# Patient Record
Sex: FEMALE | Race: WHITE | URBAN - METROPOLITAN AREA
[De-identification: names, ages, dates, MRNs, and addresses within clinical notes are randomized per-mention and may not be internally consistent; named-entity substitution may affect disease eponyms.]

---

## 2024-08-16 ENCOUNTER — HOME HEALTH ADMISSION (OUTPATIENT)
Dept: HOME HEALTH SERVICES | Facility: HOME HEALTH | Age: 69
End: 2024-08-16
Payer: MEDICARE

## 2024-08-19 ENCOUNTER — HOME CARE VISIT (OUTPATIENT)
Dept: SCHEDULING | Facility: HOME HEALTH | Age: 69
End: 2024-08-19

## 2024-08-19 VITALS
TEMPERATURE: 98.5 F | OXYGEN SATURATION: 96 % | HEART RATE: 58 BPM | SYSTOLIC BLOOD PRESSURE: 110 MMHG | DIASTOLIC BLOOD PRESSURE: 60 MMHG | RESPIRATION RATE: 16 BRPM

## 2024-08-19 PROCEDURE — 0221000100 HH NO PAY CLAIM PROCEDURE

## 2024-08-19 PROCEDURE — G0299 HHS/HOSPICE OF RN EA 15 MIN: HCPCS

## 2024-08-20 ENCOUNTER — HOME CARE VISIT (OUTPATIENT)
Dept: SCHEDULING | Facility: HOME HEALTH | Age: 69
End: 2024-08-20

## 2024-08-20 VITALS
OXYGEN SATURATION: 96 % | SYSTOLIC BLOOD PRESSURE: 97 MMHG | DIASTOLIC BLOOD PRESSURE: 66 MMHG | HEART RATE: 62 BPM | TEMPERATURE: 97.8 F

## 2024-08-20 PROCEDURE — G0151 HHCP-SERV OF PT,EA 15 MIN: HCPCS

## 2024-08-20 ASSESSMENT — ENCOUNTER SYMPTOMS
DYSPNEA ACTIVITY LEVEL: AFTER AMBULATING 10 - 20 FT
PAIN LOCATION - PAIN QUALITY: ACHY

## 2024-08-20 NOTE — HOME HEALTH
Came home friday after being in hospital and rehab since the end of May 24. She returned home 4 days ago. She lives in single family home with her  who had a stroke some years ago so he does not drive. Patient has good family support. She reports that she had colostomy placed (reversible) after removal of abdominal abscess. She reports she is independent in colostomy management. She reports LBP of 6/10 today in lower right side. Surgical scar mid abdomen,abdominal drain was in rhs abdomen anteriorly and her colostomy bag is on LHS.  She has a past medical hx significant for diverticulitus, atrial fibrillation, DM type 2, COPD, CHF, HTN, hypokalemia, obesity. Patient reports that her colostomy bag is on side and she still has tenderness over right side where her abdominal abscess was.  Patient was evaluated by skilled physical therapy today with the following findings:  Posture: Flexed posture of hips in standing and during ambulation although she is able to correct with verbal cues.   Transfers: Min assist sit<>stand, min-mod assist from bed<>Stand, min-mod assist for getting on and off commode. Did not attempt stepping in and out of tub today secondary to decreased strength and balance deficits.   Balance: Good in sitting, fair in standing, fair- during ambulation with wheeled walker especially during turns. Patient scores > 30s on TUG and 14/28 on Tinetti TAWANA indicating that she is at high risk of falls.   Strength: Patient has decreased trunk strength as evidenced by her diffculty rolling and transferring supine<>sit. She also exhibits bilateral LE weakness with bilateral hip strength of 3-/5 for major muscle groups of both hips, 3/5 for bilateral knee flexors and extensors and 3+/5 for bilateral ankle plantar and dorsiflexors.   Gait: Patient is currently ambulating with mod assist with wheeled walker. She ambulated 60 feet today with mod assist for safety during turns and mod assist and verbal cues for

## 2024-08-21 ENCOUNTER — HOME CARE VISIT (OUTPATIENT)
Dept: SCHEDULING | Facility: HOME HEALTH | Age: 69
End: 2024-08-21

## 2024-08-21 VITALS
TEMPERATURE: 97.1 F | SYSTOLIC BLOOD PRESSURE: 95 MMHG | OXYGEN SATURATION: 98 % | RESPIRATION RATE: 18 BRPM | DIASTOLIC BLOOD PRESSURE: 66 MMHG | HEART RATE: 59 BPM

## 2024-08-21 PROCEDURE — G0152 HHCP-SERV OF OT,EA 15 MIN: HCPCS

## 2024-08-21 ASSESSMENT — ENCOUNTER SYMPTOMS: PAIN LOCATION - PAIN QUALITY: ACHE

## 2024-08-21 NOTE — HOME HEALTH
This patient has answered NO to the following questions:   1) have you traveled outside the country   2) have you been exposed to or been in contact with anyone whom traveled outside the country in the past two weeks   3) do you have a sore throat/fever/dry cough      4)The patient has been educated on and demonstrates good understanding via the teach-back method for the following: Signs and symptoms of COVID-19 yes    Pt is a 69 year old female who lives with . Pt referred for therapy  after being in hospital may  due to abdominal abscess, followed with rehabed home 5 days ago. She lives in single family home with her  who had a stroke some years ago so he does not drive. Patient has good family support. She reports that she had colostomy placed (reversible) after removal of abdominal abscess. She reports she is independent in colostomy management. She reports LBP of 7/10 today in lower right side.  She has a past medical hx significant for diverticulitus, atrial fibrillation, DM type 2, COPD, CHF, HTN, hypokalemia, obesity,  back surgery dec 1998  Patient reports that her colostomy bag is on side and she still has tenderness over right side where her abdominal abscess was. Pt ambulates in home with walker with CGA for safety, VC for positioning of walker with funcitonal transfers, entering small bedroom and bathroom to reduce risk of falls. Pt requires CGA for ADLs ,  demo's decresaed standing tolerance/balance requiring frequent rest periods with ADL's and IADL's and UE support at all times.   P has been performing sponge bath only since return home with step over tub and unstable grab bar. Pt reports she is ordering a  shower bench for bathing.  Pt demo's bilat UE strength 3+/5 for transfers. OT recommended removing shower doors and  using shower curtain to accomodate shower bench. Shower transfer was not assessed and to be assessed when pt has tub bench and doors removed with shower curtain.

## 2024-08-23 ASSESSMENT — ENCOUNTER SYMPTOMS
PAIN LOCATION - PAIN QUALITY: ACHY
DYSPNEA ACTIVITY LEVEL: AFTER AMBULATING 10 - 20 FT

## 2024-08-23 NOTE — HOME HEALTH
Reason for the visit:SOC  History of present illness:A fib  Chief complaint and PMH:A fib, colostomy   General description of the patient:alert and oriented, skin intact, colostomy noted, uses walker and wheelchair for ambulation  Assessment, plan and focus of care:disease process education, medication managment   Caregiver involvement:  provides assistance   Medication reconciliation:no med changes  Reason for homebound status:weakness, taxing effort to leave home   Patient education provided this visit: disease process managment, medication managment   Patient/cg response to education:patient and  able to teach back and verbalized understanding   PCP/Next PCP appointment:will schedule, nurse offered to schedule but patient states she has to figure out her schedule first   Continues to need nursing care for:disease process education, medication managment  The following discharge planning was discussed with the patient:when goals met

## 2024-08-27 ENCOUNTER — HOME CARE VISIT (OUTPATIENT)
Dept: SCHEDULING | Facility: HOME HEALTH | Age: 69
End: 2024-08-27

## 2024-08-27 VITALS
DIASTOLIC BLOOD PRESSURE: 58 MMHG | SYSTOLIC BLOOD PRESSURE: 100 MMHG | OXYGEN SATURATION: 95 % | HEART RATE: 62 BPM | TEMPERATURE: 96.5 F | RESPIRATION RATE: 18 BRPM

## 2024-08-27 VITALS
SYSTOLIC BLOOD PRESSURE: 105 MMHG | TEMPERATURE: 97.6 F | HEART RATE: 55 BPM | RESPIRATION RATE: 18 BRPM | OXYGEN SATURATION: 96 % | DIASTOLIC BLOOD PRESSURE: 60 MMHG

## 2024-08-27 PROCEDURE — G0152 HHCP-SERV OF OT,EA 15 MIN: HCPCS

## 2024-08-27 PROCEDURE — G0151 HHCP-SERV OF PT,EA 15 MIN: HCPCS

## 2024-08-27 ASSESSMENT — ENCOUNTER SYMPTOMS
PAIN LOCATION - PAIN QUALITY: ACHY
PAIN LOCATION - PAIN QUALITY: ACHE

## 2024-08-27 NOTE — HOME HEALTH
This patient has answered NO to the following questions:   1) have you traveled outside the country   2) have you been exposed to or been in contact with anyone whom traveled outside the country in the past two weeks   3) do you have a sore throat/fever/dry cough      4)The patient has been educated on and demonstrates good understanding via the teach-back method for the following: Signs and symptoms of COVID-19 yes    No reported changes or incidents since previous visit  Pt sees primary MD tomorrow.  Pt instructed in safe transfer to shower with new shower bench, OT adjusted chair to place properly in tub and also adjust height of bench.  Pt able to perform with CGA and min VC for proper technique and keeping body aligned to prevent falls. Pt has taken doors to shower down and plans to buy shower curtain. OT recommended rubber back shower rug for outside of tub vs towels which slide on floor. Pt is indep with changing colostomy.   Pt did not want assist with bathing today reporting she did one yestserday and will do tomorrow prior to MD appointmentment.  Instructed pt in safe mobility in home, pt now ambulating with no assistive device with CGA, VC to slow down  .   Instructed pt in bilat UE strengthening in unsupported sitting to increase core strength for sitting ADLs, posture exercises and deep breathing.  Pt tolerates 10 reps to shoulders, elbows,   in unsupported sitting to increase core and trunk control for sitting ADL's.  Pt requires mod VC for proper form, breathing, pacing and slowing exercises down after teach back method  Plan to continue increasing UE strength, ADL's, safety with transfers.  Discussed DC plan  with pt and caregiver, plan to DC when goals met  Pt progressing well towards OT goals

## 2024-08-27 NOTE — HOME HEALTH
Patient reports that she is managing back pain by getting up and moving around frequently, Reviewed medications - patient has still not received Eliquis prescription and has appt with PCP tomorrow to follow-up on same. She is independent in HEP of  ankle pumps; knee ext, marching, hip abduction/adduction 3 x 10 and sit<>stand x 5 in sitting as evidenced by teach-back method. She has purchased a shower bench and is able to  get in and out of tub to take a shower with SBA.  Gait reeducation with wheeled walker x 120 feet, indoors and outdoors including small step at front door and incline on driveway.  Transfer transfers sit<>Stand, bed<>Stand independently. Patient was able to participate well in all activities although she was fatigued after tx. She demonstrated good understanding of all tasks using teach back method. She is now being discharged from PT services as all goals have been met. She has follow up visit with PCP tomorrow.

## 2024-08-28 ENCOUNTER — HOME CARE VISIT (OUTPATIENT)
Dept: SCHEDULING | Facility: HOME HEALTH | Age: 69
End: 2024-08-28

## 2024-08-29 NOTE — HOME HEALTH
The patient made the nurse aware that she had several appointments today and preferred a visit to be made on another day.

## 2024-09-04 ENCOUNTER — HOME CARE VISIT (OUTPATIENT)
Dept: SCHEDULING | Facility: HOME HEALTH | Age: 69
End: 2024-09-04
Payer: MEDICARE

## 2024-09-04 VITALS
RESPIRATION RATE: 18 BRPM | SYSTOLIC BLOOD PRESSURE: 99 MMHG | DIASTOLIC BLOOD PRESSURE: 57 MMHG | HEART RATE: 60 BPM | TEMPERATURE: 97 F | OXYGEN SATURATION: 95 %

## 2024-09-04 ASSESSMENT — ENCOUNTER SYMPTOMS: PAIN LOCATION - PAIN QUALITY: ACHE

## 2024-09-04 NOTE — HOME HEALTH
This patient has answered NO to the following questions:   1) have you traveled outside the country   2) have you been exposed to or been in contact with anyone whom traveled outside the country in the past two weeks   3) do you have a sore throat/fever/dry cough      4)The patient has been educated on and demonstrates good understanding via the teach-back method for the following: Signs and symptoms of COVID-19 yes      No reported changes or incidents since previous visit  Pt seen for OT DC today. Pt insructed in bilat UE strenghening, posture exercises, core strengthening and trunk control to assist with ADL's, transfers. Instructed pt in energy conservation , body mechanics, work simplification with ADL's to reduce fatigue and risk for falls, adaptive equipment recommendations and training  Pt indep with HEP and met all goals, receptive to OT DC.

## 2024-09-11 ENCOUNTER — HOME CARE VISIT (OUTPATIENT)
Dept: HOME HEALTH SERVICES | Facility: HOME HEALTH | Age: 69
End: 2024-09-11
Payer: MEDICARE

## 2024-09-20 ASSESSMENT — ENCOUNTER SYMPTOMS: PAIN LOCATION - PAIN QUALITY: ACHE
